# Patient Record
Sex: FEMALE | Race: WHITE | Employment: OTHER | URBAN - METROPOLITAN AREA
[De-identification: names, ages, dates, MRNs, and addresses within clinical notes are randomized per-mention and may not be internally consistent; named-entity substitution may affect disease eponyms.]

---

## 2023-12-08 PROBLEM — I35.9 AORTIC VALVE DISORDER: Status: ACTIVE | Noted: 2023-12-08

## 2023-12-08 PROBLEM — I10 HYPERTENSION: Status: ACTIVE | Noted: 2023-12-08

## 2023-12-08 PROBLEM — E78.00 HYPERCHOLESTEROLEMIA: Status: ACTIVE | Noted: 2023-12-08

## 2023-12-08 PROBLEM — I35.0 AORTIC STENOSIS: Status: ACTIVE | Noted: 2023-12-08

## 2023-12-08 PROBLEM — I50.9 CONGESTIVE HEART FAILURE (MULTI): Status: ACTIVE | Noted: 2023-12-08

## 2023-12-08 PROBLEM — E11.9 DIABETES MELLITUS, TYPE 2 (MULTI): Status: ACTIVE | Noted: 2023-12-08

## 2023-12-08 RX ORDER — LOSARTAN POTASSIUM 100 MG/1
1 TABLET ORAL DAILY
COMMUNITY
Start: 2014-01-15 | End: 2024-05-22 | Stop reason: SDUPTHER

## 2023-12-08 RX ORDER — BLOOD-GLUCOSE METER
EACH MISCELLANEOUS
COMMUNITY
Start: 2017-10-23

## 2023-12-08 RX ORDER — METFORMIN HYDROCHLORIDE 500 MG/1
2 TABLET ORAL DAILY
COMMUNITY
Start: 2021-04-07

## 2023-12-08 RX ORDER — ACETAMINOPHEN 500 MG
1 TABLET ORAL DAILY
COMMUNITY
Start: 2014-11-05

## 2023-12-08 RX ORDER — ATORVASTATIN CALCIUM 20 MG/1
1 TABLET, FILM COATED ORAL NIGHTLY
COMMUNITY
Start: 2017-08-23 | End: 2024-05-22 | Stop reason: SDUPTHER

## 2023-12-08 RX ORDER — HYDROCHLOROTHIAZIDE 50 MG/1
1 TABLET ORAL DAILY
COMMUNITY
Start: 2014-01-15 | End: 2023-12-29 | Stop reason: SDUPTHER

## 2023-12-08 RX ORDER — POTASSIUM CHLORIDE 750 MG/1
TABLET, FILM COATED, EXTENDED RELEASE ORAL
COMMUNITY
Start: 2019-06-05

## 2023-12-08 RX ORDER — AMLODIPINE BESYLATE 10 MG/1
1 TABLET ORAL DAILY
COMMUNITY
Start: 2014-01-15 | End: 2024-05-22 | Stop reason: SDUPTHER

## 2023-12-13 ENCOUNTER — OFFICE VISIT (OUTPATIENT)
Dept: CARDIOLOGY | Facility: CLINIC | Age: 73
End: 2023-12-13
Payer: MEDICARE

## 2023-12-13 VITALS
HEIGHT: 63 IN | SYSTOLIC BLOOD PRESSURE: 104 MMHG | HEART RATE: 75 BPM | BODY MASS INDEX: 36.48 KG/M2 | OXYGEN SATURATION: 96 % | DIASTOLIC BLOOD PRESSURE: 67 MMHG | WEIGHT: 205.9 LBS

## 2023-12-13 DIAGNOSIS — E78.00 HYPERCHOLESTEROLEMIA: ICD-10-CM

## 2023-12-13 DIAGNOSIS — I50.9 CONGESTIVE HEART FAILURE, UNSPECIFIED HF CHRONICITY, UNSPECIFIED HEART FAILURE TYPE (MULTI): ICD-10-CM

## 2023-12-13 DIAGNOSIS — I15.9 SECONDARY HYPERTENSION: Primary | ICD-10-CM

## 2023-12-13 DIAGNOSIS — I35.0 NONRHEUMATIC AORTIC VALVE STENOSIS: ICD-10-CM

## 2023-12-13 PROCEDURE — 1159F MED LIST DOCD IN RCRD: CPT | Performed by: INTERNAL MEDICINE

## 2023-12-13 PROCEDURE — 93010 ELECTROCARDIOGRAM REPORT: CPT | Performed by: INTERNAL MEDICINE

## 2023-12-13 PROCEDURE — 4010F ACE/ARB THERAPY RXD/TAKEN: CPT | Performed by: INTERNAL MEDICINE

## 2023-12-13 PROCEDURE — 99214 OFFICE O/P EST MOD 30 MIN: CPT | Mod: PO,25 | Performed by: INTERNAL MEDICINE

## 2023-12-13 PROCEDURE — 3078F DIAST BP <80 MM HG: CPT | Performed by: INTERNAL MEDICINE

## 2023-12-13 PROCEDURE — 1126F AMNT PAIN NOTED NONE PRSNT: CPT | Performed by: INTERNAL MEDICINE

## 2023-12-13 PROCEDURE — 3074F SYST BP LT 130 MM HG: CPT | Performed by: INTERNAL MEDICINE

## 2023-12-13 PROCEDURE — 1036F TOBACCO NON-USER: CPT | Performed by: INTERNAL MEDICINE

## 2023-12-13 PROCEDURE — 93005 ELECTROCARDIOGRAM TRACING: CPT | Mod: PO | Performed by: INTERNAL MEDICINE

## 2023-12-13 PROCEDURE — 99214 OFFICE O/P EST MOD 30 MIN: CPT | Performed by: INTERNAL MEDICINE

## 2023-12-13 RX ORDER — SEMAGLUTIDE 1.34 MG/ML
1 INJECTION, SOLUTION SUBCUTANEOUS
COMMUNITY

## 2023-12-13 ASSESSMENT — PAIN SCALES - GENERAL: PAINLEVEL: 0-NO PAIN

## 2023-12-13 ASSESSMENT — PATIENT HEALTH QUESTIONNAIRE - PHQ9
SUM OF ALL RESPONSES TO PHQ9 QUESTIONS 1 AND 2: 0
2. FEELING DOWN, DEPRESSED OR HOPELESS: NOT AT ALL
1. LITTLE INTEREST OR PLEASURE IN DOING THINGS: NOT AT ALL

## 2023-12-13 ASSESSMENT — COLUMBIA-SUICIDE SEVERITY RATING SCALE - C-SSRS
6. HAVE YOU EVER DONE ANYTHING, STARTED TO DO ANYTHING, OR PREPARED TO DO ANYTHING TO END YOUR LIFE?: NO
1. IN THE PAST MONTH, HAVE YOU WISHED YOU WERE DEAD OR WISHED YOU COULD GO TO SLEEP AND NOT WAKE UP?: NO
2. HAVE YOU ACTUALLY HAD ANY THOUGHTS OF KILLING YOURSELF?: NO

## 2023-12-13 ASSESSMENT — ENCOUNTER SYMPTOMS
LOSS OF SENSATION IN FEET: 0
DEPRESSION: 0
OCCASIONAL FEELINGS OF UNSTEADINESS: 0

## 2023-12-13 NOTE — PROGRESS NOTES
Primary Care Physician: Siomara Cormier MD  Date of Visit: 12/13/2023 11:20 AM EST  [place] [facility] [place] [address] [place]        HPI / Summary:   This is a 72-year-old woman who presented in 1994 with congestive heart failure, severe left ventricular dysfunction with ejection fraction of 20% and severe hypertension  After controlling her hypertension with ACE inhibitors and beta blockers her ejection fraction went up to 60%.  She also has aortic stenosis and obesity  Her echocardiogram from 1/8/13 showed normal left ventricular systolic function mild concentric hypertrophy and mild to moderate aortic stenosis with peak gradient of 20 mm of mercury and aortic valve area of 1.1  15 years ago she had lumpectomy for breast cancer and received radiation and chemotherapy with excellent results She is doing well with no chest pain and no shortness of breath  Repeat echocardiogram on January 1, 2019 showed normal left ventricular systolic function with ejection fraction of 60% with mild hypertrophy and abnormal relaxation and enlarged left atrium. She had mild to moderate calcific aortic stenosis with no significant change from November 1, 2016  Repeat echocardiogram on April 26, 2021 showed normal left ventricular systolic function with ejection fraction of 60%  It was technically difficult but was suggestive of mild aortic stenosis also on exam  Blood test on December 7, 2022 was all normal except for glucose of 113 cholesterol was 175 and HDL 63.4  She is doing well. No chest pain and no shortness of breath and remains active  She is taking Ozempic and lost 20 pounds in the past 1 year and working on diet to lose more  Continue current medical therapy.  She will get blood test with her primary doctor.  Follow-up in 6 months        Past Medical History:  Past Medical History:   Diagnosis Date    Personal history of other endocrine, nutritional and metabolic disease     History of obesity        Past Surgical  History:  Past Surgical History:   Procedure Laterality Date    INCISIONAL BREAST BIOPSY  10/05/2016    Incisional Breast Biopsy    OTHER SURGICAL HISTORY  10/05/2016    Episiotomy          Social History:   reports that she has never smoked. She has never used smokeless tobacco. She reports current alcohol use of about 2.0 standard drinks of alcohol per week. She reports that she does not use drugs.     Family History:  family history is not on file.      Allergies:  No Known Allergies    Outpatient Medications:  Current Outpatient Medications   Medication Instructions    amLODIPine (Norvasc) 10 mg tablet 1 tablet, oral, Daily    atorvastatin (Lipitor) 20 mg tablet 1 tablet, oral, Nightly    blood sugar diagnostic (OneTouch Verio test strips) strip U TO TEST D    cholecalciferol (Vitamin D-3) 50 mcg (2,000 unit) capsule 1 capsule, oral, Daily    hydroCHLOROthiazide (HYDRODiuril) 50 mg tablet 1 tablet, oral, Daily    losartan (Cozaar) 100 mg tablet 1 tablet, oral, Daily    metFORMIN (Glucophage) 500 mg tablet 2 tablets, oral, Daily    Ozempic 1 mg, subcutaneous, Weekly    potassium chloride CR 10 mEq ER tablet oral, Daily RT       Physical Exam:  Constitutional: alert and in no acute distress.  Eyes: no erythema, swelling or discharge from the eye.  Neck: neck is supple, symmetric, trachea midline, no masses, and no thyromegaly.  Pulmonary: no increased work of breathing or signs of respiratory distress and lungs clear to auscultation.  Cardiovascular: carotid pulses 2+ bilaterally with no bruit, JVP was normal, no thrills, regular rhythm, normal S1 and S2, grade 2/6 ejection systolic murmur at the right intercostal space, pedal pulses 2+ bilaterally and no pitting edema.  Abdomen: Abdomen non-tender, no masses, and no hepatomegaly.  Skin: Skin warm and dry, normal skin turgor  Neurologic: non- focal neurologic examination.  Psychiatric: judgement and insight is normal and oriented to person place and time.     "  Vitals:    12/13/23 1147   BP: 104/67   BP Location: Left arm   Patient Position: Sitting   BP Cuff Size: Large adult   Pulse: 75   SpO2: 96%   Weight: 93.4 kg (205 lb 14.4 oz)   Height: 1.6 m (5' 3\")     Wt Readings from Last 5 Encounters:   12/13/23 93.4 kg (205 lb 14.4 oz)   06/28/23 96.3 kg (212 lb 4 oz)   12/07/22 102 kg (225 lb 11.2 oz)   07/06/22 102 kg (224 lb 5 oz)   01/05/22 104 kg (230 lb)     Body mass index is 36.47 kg/m².      Last Labs:  CMP:  Recent Labs     12/07/22  1233 01/05/22  1209 04/07/21  1215 12/11/19  1114 06/05/19  1209    141 141 141 141   K 3.8 3.9 3.7 3.5 3.4*    99 100 98 101   CO2 32 31 32 32 28   ANIONGAP 15 15 13 15 15   BUN 13 11 14 13 21   CREATININE 0.53 0.62 0.67 0.62 0.55   GLUCOSE 113* 102* 114* 108* 97     Recent Labs     12/07/22  1233 03/10/22  0841 01/05/22  1209 04/07/21  1215 12/26/18  1254   ALBUMIN 4.4  --  4.5 4.4 4.6   ALKPHOS 60  --  61 59 61   ALT 16 20 21 26 20   AST 12 13 14 18 16   BILITOT 0.6  --  0.6 0.6 0.6     CBC:  Recent Labs     12/07/22  1233 01/05/22  1209 04/07/21  1215 06/05/19  1209   WBC 7.7 8.1 8.2 10.1   HGB 13.9 15.1 15.3 14.5   HCT 42.9 44.2 45.5 44.8    244 254 236   MCV 98 96 97 97     COAG: No results for input(s): \"INR\", \"DDIMERVTE\" in the last 28700 hours.  HEME/ENDO:  Recent Labs     07/05/23  0842 01/03/23  1020 12/07/22  1233 06/28/22  0920 01/05/22  1209 12/13/21  1119 04/07/21  1215 06/05/19  1209   TSH  --   --  2.63  --  2.67  --  2.73 2.73   HGBA1C 5.6 6.2  --  6.3*  --  6.1 6.7  --       CARDIAC: No results for input(s): \"LDH\", \"CKMB\", \"TROPHS\", \"BNP\" in the last 21614 hours.    No lab exists for component: \"CK\", \"CKMBP\"  Recent Labs     12/07/22  1233 03/10/22  0841 01/05/22  1209 06/05/19  1209   CHOL 175 185 200* 188   LDLF  --  100* 110* 87   HDL 63.4 58.6 60.2 62.6   TRIG  --  130 150* 192*       Last Cardiology Tests:  ECG:  The electrocardiogram shows normal sinus rhythm with left axis deviation and " poor R wave progression    Echo:  Echo Results:  No results found for this or any previous visit from the past 3650 days.       Cath:      Stress Test:  Stress Results:  No results found for this or any previous visit from the past 365 days.         Cardiac Imaging:  Electrocardiogram 12 Lead  Sinus rhythm with Premature ventricular complexes  Low voltage QRS, consider pulmonary disease, pericardial effusion, or normal variant  Borderline ECG  When compared with ECG of 01-SEP-2021 11:22,  Premature ventricular complexes is now Present  Confirmed by Reagan Torres ([id number]) on 12/7/2022 4:54:24 PM          Orders:  No orders of the defined types were placed in this encounter.     Followup Appts:  No future appointments.        ____________________________________________________________  Reagan Torres MD  [facility]

## 2023-12-14 LAB
ATRIAL RATE: 75 BPM
P AXIS: 48 DEGREES
P OFFSET: 156 MS
P ONSET: 116 MS
PR INTERVAL: 190 MS
Q ONSET: 211 MS
QRS COUNT: 12 BEATS
QRS DURATION: 96 MS
QT INTERVAL: 420 MS
QTC CALCULATION(BAZETT): 469 MS
QTC FREDERICIA: 452 MS
R AXIS: -24 DEGREES
T AXIS: 47 DEGREES
T OFFSET: 421 MS
VENTRICULAR RATE: 75 BPM

## 2023-12-29 ENCOUNTER — TELEPHONE (OUTPATIENT)
Dept: CARDIOLOGY | Facility: HOSPITAL | Age: 73
End: 2023-12-29

## 2023-12-29 DIAGNOSIS — I15.9 SECONDARY HYPERTENSION: ICD-10-CM

## 2023-12-29 DIAGNOSIS — I50.9 CONGESTIVE HEART FAILURE, UNSPECIFIED HF CHRONICITY, UNSPECIFIED HEART FAILURE TYPE (MULTI): ICD-10-CM

## 2023-12-29 RX ORDER — HYDROCHLOROTHIAZIDE 50 MG/1
50 TABLET ORAL DAILY
Qty: 90 TABLET | Refills: 0 | Status: SHIPPED | OUTPATIENT
Start: 2023-12-29 | End: 2024-01-10 | Stop reason: SDUPTHER

## 2024-01-11 RX ORDER — HYDROCHLOROTHIAZIDE 50 MG/1
50 TABLET ORAL DAILY
Qty: 90 TABLET | Refills: 0 | Status: SHIPPED | OUTPATIENT
Start: 2024-01-11 | End: 2024-04-15

## 2024-05-22 ENCOUNTER — TELEPHONE (OUTPATIENT)
Dept: CARDIOLOGY | Facility: HOSPITAL | Age: 74
End: 2024-05-22

## 2024-05-22 DIAGNOSIS — I50.9 CONGESTIVE HEART FAILURE, UNSPECIFIED HF CHRONICITY, UNSPECIFIED HEART FAILURE TYPE (MULTI): ICD-10-CM

## 2024-05-22 DIAGNOSIS — I15.9 SECONDARY HYPERTENSION: Primary | ICD-10-CM

## 2024-05-22 DIAGNOSIS — E78.00 HYPERCHOLESTEROLEMIA: ICD-10-CM

## 2024-05-22 RX ORDER — HYDROCHLOROTHIAZIDE 50 MG/1
50 TABLET ORAL DAILY
Qty: 90 TABLET | Refills: 0 | Status: SHIPPED | OUTPATIENT
Start: 2024-05-22

## 2024-05-22 RX ORDER — AMLODIPINE BESYLATE 10 MG/1
10 TABLET ORAL DAILY
Qty: 90 TABLET | Refills: 0 | Status: SHIPPED | OUTPATIENT
Start: 2024-05-22

## 2024-05-22 RX ORDER — LOSARTAN POTASSIUM 100 MG/1
100 TABLET ORAL DAILY
Qty: 90 TABLET | Refills: 0 | Status: SHIPPED | OUTPATIENT
Start: 2024-05-22

## 2024-05-22 RX ORDER — METRONIDAZOLE 7.5 MG/G
CREAM TOPICAL
COMMUNITY
Start: 2024-03-27

## 2024-05-22 RX ORDER — ATORVASTATIN CALCIUM 20 MG/1
20 TABLET, FILM COATED ORAL NIGHTLY
Qty: 90 TABLET | Refills: 0 | Status: SHIPPED | OUTPATIENT
Start: 2024-05-22

## 2024-06-17 ENCOUNTER — TELEPHONE (OUTPATIENT)
Dept: CARDIOLOGY | Facility: HOSPITAL | Age: 74
End: 2024-06-17
Payer: MEDICARE

## 2024-06-17 DIAGNOSIS — I15.9 SECONDARY HYPERTENSION: ICD-10-CM

## 2024-06-17 DIAGNOSIS — I50.9 CONGESTIVE HEART FAILURE, UNSPECIFIED HF CHRONICITY, UNSPECIFIED HEART FAILURE TYPE (MULTI): ICD-10-CM

## 2024-06-17 RX ORDER — HYDROCHLOROTHIAZIDE 50 MG/1
50 TABLET ORAL DAILY
Qty: 90 TABLET | Refills: 3 | Status: SHIPPED | OUTPATIENT
Start: 2024-06-17

## 2024-06-17 RX ORDER — POTASSIUM CHLORIDE 750 MG/1
20 TABLET, FILM COATED, EXTENDED RELEASE ORAL
Qty: 180 TABLET | Refills: 3 | Status: SHIPPED | OUTPATIENT
Start: 2024-06-17

## 2024-06-19 ENCOUNTER — APPOINTMENT (OUTPATIENT)
Dept: CARDIOLOGY | Facility: CLINIC | Age: 74
End: 2024-06-19
Payer: MEDICARE

## 2024-07-09 PROBLEM — H52.4 PRESBYOPIA OF BOTH EYES: Status: ACTIVE | Noted: 2023-06-23

## 2024-07-09 PROBLEM — H52.13 MYOPIA OF BOTH EYES: Status: ACTIVE | Noted: 2023-06-23

## 2024-07-09 PROBLEM — Z86.010 HX OF ADENOMATOUS COLONIC POLYPS: Status: ACTIVE | Noted: 2024-07-09

## 2024-07-09 PROBLEM — H43.813 PVD (POSTERIOR VITREOUS DETACHMENT), BOTH EYES: Status: ACTIVE | Noted: 2023-06-23

## 2024-07-09 PROBLEM — H52.203 ASTIGMATISM OF BOTH EYES: Status: ACTIVE | Noted: 2023-06-23

## 2024-07-09 PROBLEM — H25.9 AGE-RELATED CATARACT OF BOTH EYES: Status: ACTIVE | Noted: 2023-06-23

## 2024-07-09 PROBLEM — Z86.0101 HX OF ADENOMATOUS COLONIC POLYPS: Status: ACTIVE | Noted: 2024-07-09

## 2024-07-09 PROBLEM — H00.15 CHALAZION OF LEFT LOWER EYELID: Status: ACTIVE | Noted: 2023-06-23

## 2024-07-09 PROBLEM — H04.129 DRY EYE SYNDROME: Status: ACTIVE | Noted: 2023-06-23

## 2024-07-09 RX ORDER — SEMAGLUTIDE 2.68 MG/ML
INJECTION, SOLUTION SUBCUTANEOUS
COMMUNITY
Start: 2024-05-24

## 2024-07-09 RX ORDER — METFORMIN HYDROCHLORIDE 500 MG/1
1 TABLET, EXTENDED RELEASE ORAL
COMMUNITY
Start: 2024-05-21

## 2024-07-10 ENCOUNTER — OFFICE VISIT (OUTPATIENT)
Dept: CARDIOLOGY | Facility: CLINIC | Age: 74
End: 2024-07-10
Payer: MEDICARE

## 2024-07-10 ENCOUNTER — LAB (OUTPATIENT)
Dept: LAB | Facility: LAB | Age: 74
End: 2024-07-10
Payer: MEDICARE

## 2024-07-10 VITALS
HEIGHT: 63 IN | BODY MASS INDEX: 36.68 KG/M2 | OXYGEN SATURATION: 95 % | HEART RATE: 62 BPM | DIASTOLIC BLOOD PRESSURE: 74 MMHG | WEIGHT: 207 LBS | SYSTOLIC BLOOD PRESSURE: 128 MMHG

## 2024-07-10 DIAGNOSIS — I10 ESSENTIAL HYPERTENSION: ICD-10-CM

## 2024-07-10 DIAGNOSIS — I15.9 SECONDARY HYPERTENSION: ICD-10-CM

## 2024-07-10 DIAGNOSIS — I10 ESSENTIAL HYPERTENSION: Primary | ICD-10-CM

## 2024-07-10 DIAGNOSIS — I35.0 NONRHEUMATIC AORTIC VALVE STENOSIS: ICD-10-CM

## 2024-07-10 DIAGNOSIS — I50.9 CONGESTIVE HEART FAILURE, UNSPECIFIED HF CHRONICITY, UNSPECIFIED HEART FAILURE TYPE (MULTI): ICD-10-CM

## 2024-07-10 DIAGNOSIS — E78.00 HYPERCHOLESTEROLEMIA: ICD-10-CM

## 2024-07-10 LAB
ANION GAP SERPL CALC-SCNC: 10 MMOL/L (ref 10–20)
BUN SERPL-MCNC: 16 MG/DL (ref 6–23)
CALCIUM SERPL-MCNC: 10.1 MG/DL (ref 8.6–10.6)
CHLORIDE SERPL-SCNC: 101 MMOL/L (ref 98–107)
CO2 SERPL-SCNC: 33 MMOL/L (ref 21–32)
CREAT SERPL-MCNC: 0.59 MG/DL (ref 0.5–1.05)
EGFRCR SERPLBLD CKD-EPI 2021: >90 ML/MIN/1.73M*2
GLUCOSE SERPL-MCNC: 94 MG/DL (ref 74–99)
POTASSIUM SERPL-SCNC: 4 MMOL/L (ref 3.5–5.3)
SODIUM SERPL-SCNC: 140 MMOL/L (ref 136–145)

## 2024-07-10 PROCEDURE — 80048 BASIC METABOLIC PNL TOTAL CA: CPT

## 2024-07-10 PROCEDURE — 3078F DIAST BP <80 MM HG: CPT | Performed by: INTERNAL MEDICINE

## 2024-07-10 PROCEDURE — 1036F TOBACCO NON-USER: CPT | Performed by: INTERNAL MEDICINE

## 2024-07-10 PROCEDURE — 3074F SYST BP LT 130 MM HG: CPT | Performed by: INTERNAL MEDICINE

## 2024-07-10 PROCEDURE — 99214 OFFICE O/P EST MOD 30 MIN: CPT | Performed by: INTERNAL MEDICINE

## 2024-07-10 PROCEDURE — 36415 COLL VENOUS BLD VENIPUNCTURE: CPT

## 2024-07-10 PROCEDURE — 1126F AMNT PAIN NOTED NONE PRSNT: CPT | Performed by: INTERNAL MEDICINE

## 2024-07-10 PROCEDURE — 1159F MED LIST DOCD IN RCRD: CPT | Performed by: INTERNAL MEDICINE

## 2024-07-10 PROCEDURE — 4010F ACE/ARB THERAPY RXD/TAKEN: CPT | Performed by: INTERNAL MEDICINE

## 2024-07-10 RX ORDER — POTASSIUM CHLORIDE 750 MG/1
10 TABLET, FILM COATED, EXTENDED RELEASE ORAL
Qty: 180 TABLET | Refills: 3 | Status: SHIPPED | OUTPATIENT
Start: 2024-07-10

## 2024-07-10 ASSESSMENT — PATIENT HEALTH QUESTIONNAIRE - PHQ9
1. LITTLE INTEREST OR PLEASURE IN DOING THINGS: NOT AT ALL
SUM OF ALL RESPONSES TO PHQ9 QUESTIONS 1 AND 2: 0
2. FEELING DOWN, DEPRESSED OR HOPELESS: NOT AT ALL

## 2024-07-10 ASSESSMENT — ENCOUNTER SYMPTOMS
OCCASIONAL FEELINGS OF UNSTEADINESS: 0
DEPRESSION: 0
LOSS OF SENSATION IN FEET: 0

## 2024-07-10 ASSESSMENT — COLUMBIA-SUICIDE SEVERITY RATING SCALE - C-SSRS
2. HAVE YOU ACTUALLY HAD ANY THOUGHTS OF KILLING YOURSELF?: NO
6. HAVE YOU EVER DONE ANYTHING, STARTED TO DO ANYTHING, OR PREPARED TO DO ANYTHING TO END YOUR LIFE?: NO
1. IN THE PAST MONTH, HAVE YOU WISHED YOU WERE DEAD OR WISHED YOU COULD GO TO SLEEP AND NOT WAKE UP?: NO

## 2024-07-10 ASSESSMENT — PAIN SCALES - GENERAL: PAINLEVEL: 0-NO PAIN

## 2024-07-10 NOTE — PROGRESS NOTES
Primary Care Physician: Siomara Cormier MD  Date of Visit: 07/10/2024 11:20 AM EDT  [place] [facility] [place] [address] [place]        HPI / Summary:   Mary Kay De Luna is a 73 y.o. female  who presented in 1994 with congestive heart failure, severe left ventricular dysfunction with ejection fraction of 20% and severe hypertension  After controlling her hypertension with ACE inhibitors and beta blockers her ejection fraction went up to 60%.  She also has aortic stenosis and obesity  Her echocardiogram from 1/8/13 showed normal left ventricular systolic function mild concentric hypertrophy and mild to moderate aortic stenosis with peak gradient of 20 mm of mercury and aortic valve area of 1.1  15 years ago she had lumpectomy for breast cancer and received radiation and chemotherapy with excellent results She is doing well with no chest pain and no shortness of breath  Repeat echocardiogram on January 1, 2019 showed normal left ventricular systolic function with ejection fraction of 60% with mild hypertrophy and abnormal relaxation and enlarged left atrium. She had mild to moderate calcific aortic stenosis with no significant change from November 1, 2016  Repeat echocardiogram on April 26, 2021 showed normal left ventricular systolic function with ejection fraction of 60%  It was technically difficult but was suggestive of mild aortic stenosis also on exam  Blood test on December 7, 2022 was all normal except for glucose of 113 cholesterol was 175 and HDL 63.4  She is doing well. No chest pain and no shortness of breath and remains active  She is taking Ozempic and lost 20 pounds in the past 1 year and working on diet to lose more.  It is unchanged the past 6 months  Although the faint neck bruits are most likely radiation from aortic stenosis murmur it appears reasonable to get ultrasound of the carotids as ordered by primary doctor  Continue current medical therapy.  She will get blood test  Follow-up in 6 months              Past Medical History:  Past Medical History:   Diagnosis Date    Personal history of other endocrine, nutritional and metabolic disease     History of obesity        Past Surgical History:  Past Surgical History:   Procedure Laterality Date    INCISIONAL BREAST BIOPSY  10/05/2016    Incisional Breast Biopsy    OTHER SURGICAL HISTORY  10/05/2016    Episiotomy          Social History:   reports that she has never smoked. She has never used smokeless tobacco. She reports current alcohol use of about 2.0 standard drinks of alcohol per week. She reports that she does not use drugs.     Family History:  family history is not on file.      Allergies:  No Known Allergies    Outpatient Medications:  Current Outpatient Medications   Medication Instructions    amLODIPine (NORVASC) 10 mg, oral, Daily    atorvastatin (LIPITOR) 20 mg, oral, Nightly    blood sugar diagnostic (OneTouch Verio test strips) strip U TO TEST D    cholecalciferol (Vitamin D-3) 50 mcg (2,000 unit) capsule 1 capsule, oral, Daily    hydroCHLOROthiazide (HYDRODIURIL) 50 mg, oral, Daily    losartan (COZAAR) 100 mg, oral, Daily    metFORMIN  mg 24 hr tablet 1 tablet, oral, Daily (0630)    Ozempic 2 mg/dose (8 mg/3 mL) pen injector INJECT 2 MG UNDER THE SKIN ONE DAY A WEEK    potassium chloride CR 10 mEq ER tablet 20 mEq, oral, Daily RT       Physical Exam:  Constitutional: alert and in no acute distress.  Eyes: no erythema, swelling or discharge from the eye.  Neck: neck is supple, symmetric, trachea midline, no masses, and no thyromegaly.  Pulmonary: no increased work of breathing or signs of respiratory distress and lungs clear to auscultation.  Cardiovascular: carotid pulses 2+ bilaterally with faint bilateral bruits, JVP was normal, no thrills, regular rhythm, normal S1 and S2, grade 2/6 ejection systolic murmur at the second right intercostal space, pedal pulses 2+ bilaterally and no pitting edema.  Abdomen: Abdomen non-tender, no masses,  "and no hepatomegaly.  Skin: Skin warm and dry, normal skin turgor  Neurologic: non- focal neurologic examination.  Psychiatric: judgement and insight is normal and oriented to person place and time.      Vitals:    07/10/24 1150   BP: 128/74   BP Location: Left arm   Patient Position: Sitting   BP Cuff Size: Large adult   Pulse: 62   SpO2: 95%   Weight: 93.9 kg (207 lb)   Height: 1.6 m (5' 3\")     Wt Readings from Last 5 Encounters:   07/10/24 93.9 kg (207 lb)   12/13/23 93.4 kg (205 lb 14.4 oz)   06/28/23 96.3 kg (212 lb 4 oz)   12/07/22 102 kg (225 lb 11.2 oz)   07/06/22 102 kg (224 lb 5 oz)     Body mass index is 36.67 kg/m².      Last Labs:  CMP:  Recent Labs     12/07/22  1233 01/05/22  1209 04/07/21  1215 12/11/19  1114 06/05/19  1209    141 141 141 141   K 3.8 3.9 3.7 3.5 3.4*    99 100 98 101   CO2 32 31 32 32 28   ANIONGAP 15 15 13 15 15   BUN 13 11 14 13 21   CREATININE 0.53 0.62 0.67 0.62 0.55   GLUCOSE 113* 102* 114* 108* 97     Recent Labs     12/07/22  1233 03/10/22  0841 01/05/22  1209 04/07/21  1215 12/26/18  1254   ALBUMIN 4.4  --  4.5 4.4 4.6   ALKPHOS 60  --  61 59 61   ALT 16 20 21 26 20   AST 12 13 14 18 16   BILITOT 0.6  --  0.6 0.6 0.6     CBC:  Recent Labs     12/07/22  1233 01/05/22  1209 04/07/21  1215 06/05/19  1209   WBC 7.7 8.1 8.2 10.1   HGB 13.9 15.1 15.3 14.5   HCT 42.9 44.2 45.5 44.8    244 254 236   MCV 98 96 97 97     COAG: No results for input(s): \"INR\", \"DDIMERVTE\" in the last 91688 hours.  HEME/ENDO:  Recent Labs     07/08/24  1033 12/21/23  1255 07/05/23  0842 01/03/23  1020 12/07/22  1233 06/28/22  0920 01/05/22  1209 12/13/21  1119 04/07/21  1215 06/05/19  1209   TSH  --   --   --   --  2.63  --  2.67  --  2.73 2.73   HGBA1C 5.3 5.1 5.6 6.2  --    < >  --    < > 6.7  --     < > = values in this interval not displayed.      CARDIAC: No results for input(s): \"LDH\", \"CKMB\", \"TROPHS\", \"BNP\" in the last 78174 hours.    No lab exists for component: \"CK\", " "\"CKMBP\"  Recent Labs     12/07/22  1233 03/10/22  0841 01/05/22  1209 06/05/19  1209   CHOL 175 185 200* 188   LDLF  --  100* 110* 87   HDL 63.4 58.6 60.2 62.6   TRIG  --  130 150* 192*       Last Cardiology Tests:  ECG:        Echo:  Echo Results:  No results found for this or any previous visit from the past 3650 days.       Cath:      Stress Test:  Stress Results:  No results found for this or any previous visit from the past 365 days.         Cardiac Imaging:  ECG 12 lead (Clinic Performed)  Normal sinus rhythm  Low voltage QRS  Cannot rule out Inferior infarct , age undetermined  Abnormal ECG  When compared with ECG of 07-DEC-2022 11:44,  Premature ventricular complexes are no longer Present  Questionable change in QRS axis  Confirmed by Reagan Torres ([id number]) on 12/14/2023 12:59:30 PM          Orders:  No orders of the defined types were placed in this encounter.     Followup Appts:  No future appointments.        ____________________________________________________________  Reagan Torres MD  [facility]  "

## 2024-09-19 ENCOUNTER — TELEPHONE (OUTPATIENT)
Dept: CARDIOLOGY | Facility: HOSPITAL | Age: 74
End: 2024-09-19

## 2024-09-19 DIAGNOSIS — I15.9 SECONDARY HYPERTENSION: ICD-10-CM

## 2024-09-19 DIAGNOSIS — E78.00 HYPERCHOLESTEROLEMIA: ICD-10-CM

## 2024-09-19 RX ORDER — LOSARTAN POTASSIUM 100 MG/1
100 TABLET ORAL DAILY
Qty: 90 TABLET | Refills: 0 | Status: SHIPPED | OUTPATIENT
Start: 2024-09-19

## 2024-09-19 RX ORDER — ATORVASTATIN CALCIUM 20 MG/1
20 TABLET, FILM COATED ORAL NIGHTLY
Qty: 90 TABLET | Refills: 0 | Status: SHIPPED | OUTPATIENT
Start: 2024-09-19

## 2024-09-19 RX ORDER — AMLODIPINE BESYLATE 10 MG/1
10 TABLET ORAL DAILY
Qty: 90 TABLET | Refills: 0 | Status: SHIPPED | OUTPATIENT
Start: 2024-09-19

## 2024-11-07 ENCOUNTER — APPOINTMENT (OUTPATIENT)
Dept: CARDIOLOGY | Facility: CLINIC | Age: 74
End: 2024-11-07
Payer: MEDICARE

## 2024-11-07 VITALS
BODY MASS INDEX: 35.96 KG/M2 | OXYGEN SATURATION: 93 % | SYSTOLIC BLOOD PRESSURE: 125 MMHG | WEIGHT: 203 LBS | DIASTOLIC BLOOD PRESSURE: 79 MMHG | HEART RATE: 77 BPM

## 2024-11-07 DIAGNOSIS — I35.0 NONRHEUMATIC AORTIC VALVE STENOSIS: ICD-10-CM

## 2024-11-07 DIAGNOSIS — I50.9 CONGESTIVE HEART FAILURE, UNSPECIFIED HF CHRONICITY, UNSPECIFIED HEART FAILURE TYPE: Primary | ICD-10-CM

## 2024-11-07 DIAGNOSIS — I10 ESSENTIAL HYPERTENSION: ICD-10-CM

## 2024-11-07 DIAGNOSIS — E78.00 HYPERCHOLESTEROLEMIA: ICD-10-CM

## 2024-11-07 PROCEDURE — 1126F AMNT PAIN NOTED NONE PRSNT: CPT | Performed by: INTERNAL MEDICINE

## 2024-11-07 PROCEDURE — 3078F DIAST BP <80 MM HG: CPT | Performed by: INTERNAL MEDICINE

## 2024-11-07 PROCEDURE — 1036F TOBACCO NON-USER: CPT | Performed by: INTERNAL MEDICINE

## 2024-11-07 PROCEDURE — 4010F ACE/ARB THERAPY RXD/TAKEN: CPT | Performed by: INTERNAL MEDICINE

## 2024-11-07 PROCEDURE — 99214 OFFICE O/P EST MOD 30 MIN: CPT | Performed by: INTERNAL MEDICINE

## 2024-11-07 PROCEDURE — 3074F SYST BP LT 130 MM HG: CPT | Performed by: INTERNAL MEDICINE

## 2024-11-07 PROCEDURE — 1159F MED LIST DOCD IN RCRD: CPT | Performed by: INTERNAL MEDICINE

## 2024-11-07 ASSESSMENT — PAIN SCALES - GENERAL: PAINLEVEL_OUTOF10: 0-NO PAIN

## 2024-11-07 ASSESSMENT — ENCOUNTER SYMPTOMS
OCCASIONAL FEELINGS OF UNSTEADINESS: 0
DEPRESSION: 0
LOSS OF SENSATION IN FEET: 0

## 2024-11-07 NOTE — PROGRESS NOTES
Primary Care Physician: Siomara Cormier MD  Date of Visit: 11/07/2024  3:00 PM EST  [place] [facility] [place] [address] [place]        HPI / Summary:   Mary Kay De Luna is a 74 y.o. female  who presented in 1994 with congestive heart failure and severe left ventricular dysfunction with ejection fraction of 20% and severe hypertension  After controlling her hypertension with ACE inhibitors and beta blockers her ejection fraction went up to 60%.  She also has aortic stenosis and obesity  Her echocardiogram from 1/8/13 showed normal left ventricular systolic function mild concentric hypertrophy and mild to moderate aortic stenosis with peak gradient of 20 mm of mercury and aortic valve area of 1.1  15 years ago she had lumpectomy for breast cancer and received radiation and chemotherapy with excellent results   Echocardiogram on January 1, 2019 showed normal left ventricular systolic function with ejection fraction of 60% with mild hypertrophy and abnormal relaxation and enlarged left atrium. She had mild to moderate calcific aortic stenosis with no significant change from November 1, 2016  Echocardiogram on April 26, 2021 showed normal left ventricular systolic function with ejection fraction of 60%  It was technically difficult but was suggestive of mild aortic stenosis   Blood test on December 7, 2022 cholesterol was 175 and HDL 63.4. Blood test on 7/10/2024 was normal including K of 4.1  She is doing well. No chest pain and no shortness of breath and remains active  She is taking Ozempic and lost 20 pounds in the past 1 year and working on diet to lose more.  It is unchanged the past 6 months  Although the faint neck bruits are most likely radiation from aortic stenosis murmur the primary doctor at the [facility] ordered ultrasound of the carotids which was performed on August 6, 2024 and was normal  She is not losing significant weight while on Ozempic and we had a long discussion about diet which is extremely  important  Continue current medical therapy. Follow-up in 6 months             Past Medical History:  Past Medical History:   Diagnosis Date    Personal history of other endocrine, nutritional and metabolic disease     History of obesity        Past Surgical History:  Past Surgical History:   Procedure Laterality Date    INCISIONAL BREAST BIOPSY  10/05/2016    Incisional Breast Biopsy    OTHER SURGICAL HISTORY  10/05/2016    Episiotomy          Social History:   reports that she has never smoked. She has never used smokeless tobacco. She reports current alcohol use of about 2.0 standard drinks of alcohol per week. She reports that she does not use drugs.     Family History:  family history is not on file.      Allergies:  No Known Allergies    Outpatient Medications:  Current Outpatient Medications   Medication Instructions    amLODIPine (NORVASC) 10 mg, oral, Daily    atorvastatin (LIPITOR) 20 mg, oral, Nightly    blood sugar diagnostic (OneTouch Verio test strips) strip U TO TEST D    cholecalciferol (Vitamin D-3) 50 mcg (2,000 unit) capsule 1 capsule, oral, Daily    hydroCHLOROthiazide (HYDRODIURIL) 50 mg, oral, Daily    losartan (COZAAR) 100 mg, oral, Daily    metFORMIN  mg 24 hr tablet 1 tablet, oral, Daily (0630)    Ozempic 2 mg/dose (8 mg/3 mL) pen injector INJECT 2 MG UNDER THE SKIN ONE DAY A WEEK    potassium chloride CR 10 mEq ER tablet TAKE 1 TABLET(10 MEQ) BY MOUTH FIVE TIMES DAILY       Physical Exam:  Constitutional: alert and in no acute distress.  Eyes: no erythema, swelling or discharge from the eye.  Neck: neck is supple, symmetric, trachea midline, no masses, and no thyromegaly.  Pulmonary: no increased work of breathing or signs of respiratory distress and lungs clear to auscultation.  Cardiovascular: carotid pulses 2+ bilaterally with no bruit, JVP was normal, no thrills, regular rhythm, normal S1 and S2, no murmurs, pedal pulses 2+ bilaterally and no pitting edema.  Abdomen: Abdomen  "non-tender, no masses, and no hepatomegaly.  Skin: Skin warm and dry, normal skin turgor  Neurologic: non- focal neurologic examination.  Psychiatric: judgement and insight is normal and oriented to person place and time.      There were no vitals filed for this visit.  Wt Readings from Last 5 Encounters:   07/10/24 93.9 kg (207 lb)   12/13/23 93.4 kg (205 lb 14.4 oz)   06/28/23 96.3 kg (212 lb 4 oz)   12/07/22 102 kg (225 lb 11.2 oz)   07/06/22 102 kg (224 lb 5 oz)     There is no height or weight on file to calculate BMI.      Last Labs:  CMP:  Recent Labs     07/10/24  1246 12/07/22  1233 01/05/22  1209 04/07/21  1215 12/11/19  1114    143 141 141 141   K 4.0 3.8 3.9 3.7 3.5    100 99 100 98   CO2 33* 32 31 32 32   ANIONGAP 10 15 15 13 15   BUN 16 13 11 14 13   CREATININE 0.59 0.53 0.62 0.67 0.62   EGFR >90  --   --   --   --    GLUCOSE 94 113* 102* 114* 108*     Recent Labs     12/07/22  1233 03/10/22  0841 01/05/22  1209 04/07/21  1215 12/26/18  1254   ALBUMIN 4.4  --  4.5 4.4 4.6   ALKPHOS 60  --  61 59 61   ALT 16 20 21 26 20   AST 12 13 14 18 16   BILITOT 0.6  --  0.6 0.6 0.6     CBC:  Recent Labs     12/07/22  1233 01/05/22  1209 04/07/21  1215 06/05/19  1209   WBC 7.7 8.1 8.2 10.1   HGB 13.9 15.1 15.3 14.5   HCT 42.9 44.2 45.5 44.8    244 254 236   MCV 98 96 97 97     COAG: No results for input(s): \"INR\", \"DDIMERVTE\" in the last 13975 hours.  HEME/ENDO:  Recent Labs     07/08/24  1033 12/21/23  1255 07/05/23  0842 01/03/23  1020 12/07/22  1233 06/28/22  0920 01/05/22  1209 12/13/21  1119 04/07/21  1215 06/05/19  1209   TSH  --   --   --   --  2.63  --  2.67  --  2.73 2.73   HGBA1C 5.3 5.1 5.6 6.2  --    < >  --    < > 6.7  --     < > = values in this interval not displayed.      CARDIAC: No results for input(s): \"LDH\", \"CKMB\", \"TROPHS\", \"BNP\" in the last 10271 hours.    No lab exists for component: \"CK\", \"CKMBP\"  Recent Labs     12/07/22  1233 03/10/22  0841 01/05/22  1209 06/05/19  1209 "   CHOL 175 185 200* 188   LDLF  --  100* 110* 87   HDL 63.4 58.6 60.2 62.6   TRIG  --  130 150* 192*       Last Cardiology Tests:  ECG:        Echo:  Echo Results:  No results found for this or any previous visit from the past 3650 days.       Cath:      Stress Test:  Stress Results:  No results found for this or any previous visit from the past 365 days.         Cardiac Imaging:  ECG 12 lead (Clinic Performed)  Normal sinus rhythm  Low voltage QRS  Cannot rule out Inferior infarct , age undetermined  Abnormal ECG  When compared with ECG of 07-DEC-2022 11:44,  Premature ventricular complexes are no longer Present  Questionable change in QRS axis  Confirmed by Reagan Torres ([id number]) on 12/14/2023 12:59:30 PM          Orders:  No orders of the defined types were placed in this encounter.     Followup Appts:  Future Appointments   Date Time Provider Department Center   11/7/2024  3:00 PM Reagan Torres MD [id number]           ____________________________________________________________  Reagan Torres MD  [facility]

## 2025-05-28 NOTE — PROGRESS NOTES
Primary Care Physician: Siomara Cormier MD  Date of Visit: 05/29/2025  2:40 PM EDT  Location of visit: [facility] [place] [address]        HPI / Summary:   Mary Kay De Luna is a 74 y.o. female  who presented in 1994 with congestive heart failure and severe left ventricular dysfunction with ejection fraction of 20% and severe hypertension  After controlling her hypertension with ACE inhibitors and beta blockers her ejection fraction went up to 60%.  She also has aortic stenosis and obesity  Her echocardiogram from 1/8/13 showed normal left ventricular systolic function mild concentric hypertrophy and mild to moderate aortic stenosis with peak gradient of 20 mm of mercury and aortic valve area of 1.1  15 years ago she had lumpectomy for breast cancer and received radiation and chemotherapy with excellent results   Echocardiogram on January 1, 2019 showed normal left ventricular systolic function with ejection fraction of 60% with mild hypertrophy and abnormal relaxation and enlarged left atrium. She had mild to moderate calcific aortic stenosis with no significant change from November 1, 2016  Echocardiogram on April 26, 2021 showed normal left ventricular systolic function with ejection fraction of 60%  It was technically difficult but was suggestive of mild aortic stenosis   Blood test on 7/10/2024 was normal including K of 4.1  Blood test from the [facility] on March 21, 2025 was normal except for a glucose of 102 potassium 3.4 and good lipid profile with cholesterol 185 HDL 73 LDL 93 triglycerides 95  She is doing well. No chest pain and no shortness of breath and remains active  She was taking Ozempic and lost 20 pounds in the past 1 year and working on diet to lose more.  It is unchanged the past 6 months  Although the faint neck bruits are most likely radiation from aortic stenosis murmur the primary doctor at the [facility] ordered ultrasound of the carotids which was performed on August 6, 2024 and was  normal  She discontinued Ozempic and now is trying Mounjaro.  Weight at present is stable  Again after testing and has no and I suggested using spironolactone which she prefers to increase the potassium to 6 times a day repeat the blood test in 2 weeks  Continue current medical therapy. Follow-up in 6 months             Past Medical History:  Past Medical History:   Diagnosis Date    Personal history of other endocrine, nutritional and metabolic disease     History of obesity        Past Surgical History:  Past Surgical History:   Procedure Laterality Date    INCISIONAL BREAST BIOPSY  10/05/2016    Incisional Breast Biopsy    OTHER SURGICAL HISTORY  10/05/2016    Episiotomy          Social History:   reports that she has never smoked. She has never used smokeless tobacco. She reports current alcohol use of about 2.0 standard drinks of alcohol per week. She reports that she does not use drugs.     Family History:  family history is not on file.      Allergies:  No Known Allergies    Outpatient Medications:  Current Outpatient Medications   Medication Instructions    amLODIPine (NORVASC) 10 mg, oral, Daily    atorvastatin (Lipitor) 20 mg tablet TAKE 1 TABLET(20 MG) BY MOUTH DAILY AT BEDTIME    blood sugar diagnostic (OneTouch Verio test strips) strip U TO TEST D    cholecalciferol (Vitamin D-3) 50 mcg (2,000 unit) capsule 1 capsule, Daily    hydroCHLOROthiazide (HYDRODIURIL) 50 mg, oral, Daily    losartan (COZAAR) 100 mg, oral, Daily    metFORMIN  mg 24 hr tablet 1 tablet, Daily (0630)    Ozempic 2 mg/dose (8 mg/3 mL) pen injector INJECT 2 MG UNDER THE SKIN ONE DAY A WEEK    potassium chloride CR 10 mEq ER tablet TAKE 1 TABLET(10 MEQ) BY MOUTH FIVE TIMES DAILY       Physical Exam:  Constitutional: alert and in no acute distress.  Eyes: no erythema, swelling or discharge from the eye.  Neck: neck is supple, symmetric, trachea midline, no masses, and no thyromegaly.  Pulmonary: no increased work of breathing or  "signs of respiratory distress and lungs clear to auscultation.  Cardiovascular: carotid pulses 2+ bilaterally with no bruit, JVP was normal, no thrills, regular rhythm, normal S1 and S2, no murmurs, pedal pulses 2+ bilaterally and no pitting edema.  Abdomen: Abdomen non-tender, no masses, and no hepatomegaly.  Skin: Skin warm and dry, normal skin turgor  Neurologic: non- focal neurologic examination.  Psychiatric: judgement and insight is normal and oriented to person place and time.      There were no vitals filed for this visit.  Wt Readings from Last 5 Encounters:   11/07/24 92.1 kg (203 lb)   07/10/24 93.9 kg (207 lb)   12/13/23 93.4 kg (205 lb 14.4 oz)   06/28/23 96.3 kg (212 lb 4 oz)   12/07/22 102 kg (225 lb 11.2 oz)     There is no height or weight on file to calculate BMI.      Last Labs:  CMP:  Recent Labs     07/10/24  1246 12/07/22  1233 01/05/22  1209 04/07/21  1215 12/11/19  1114    143 141 141 141   K 4.0 3.8 3.9 3.7 3.5    100 99 100 98   CO2 33* 32 31 32 32   ANIONGAP 10 15 15 13 15   BUN 16 13 11 14 13   CREATININE 0.59 0.53 0.62 0.67 0.62   EGFR >90  --   --   --   --    GLUCOSE 94 113* 102* 114* 108*     Recent Labs     12/07/22  1233 03/10/22  0841 01/05/22  1209 04/07/21  1215 12/26/18  1254   ALBUMIN 4.4  --  4.5 4.4 4.6   ALKPHOS 60  --  61 59 61   ALT 16 20 21 26 20   AST 12 13 14 18 16   BILITOT 0.6  --  0.6 0.6 0.6     CBC:  Recent Labs     12/07/22  1233 01/05/22  1209 04/07/21  1215 06/05/19  1209   WBC 7.7 8.1 8.2 10.1   HGB 13.9 15.1 15.3 14.5   HCT 42.9 44.2 45.5 44.8    244 254 236   MCV 98 96 97 97     COAG: No results for input(s): \"INR\", \"DDIMERVTE\" in the last 91165 hours.  HEME/ENDO:  Recent Labs     07/08/24  1033 12/21/23  1255 07/05/23  0842 01/03/23  1020 12/07/22  1233 06/28/22  0920 01/05/22  1209 12/13/21  1119 04/07/21  1215 06/05/19  1209   TSH  --   --   --   --  2.63  --  2.67  --  2.73 2.73   HGBA1C 5.3 5.1 5.6 6.2  --    < >  --    < > 6.7  --     " "< > = values in this interval not displayed.      CARDIAC: No results for input(s): \"LDH\", \"CKMB\", \"TROPHS\", \"BNP\" in the last 69609 hours.    No lab exists for component: \"CK\", \"CKMBP\"  Recent Labs     12/07/22  1233 03/10/22  0841 01/05/22  1209 06/05/19  1209   CHOL 175 185 200* 188   LDLF  --  100* 110* 87   HDL 63.4 58.6 60.2 62.6   TRIG  --  130 150* 192*       Last Cardiology Tests:  ECG:    NSR with 1st degree AV block, inferior MI with no change from 12/13/2023    Echo:  Echo Results:  No results found for this or any previous visit from the past 3650 days.       Cath:      Stress Test:  Stress Results:  No results found for this or any previous visit from the past 365 days.         Cardiac Imaging:  ECG 12 lead (Clinic Performed)  Normal sinus rhythm  Low voltage QRS  Cannot rule out Inferior infarct , age undetermined  Abnormal ECG  When compared with ECG of 07-DEC-2022 11:44,  Premature ventricular complexes are no longer Present  Questionable change in QRS axis  Confirmed by Reagan Torres ([id number]) on 12/14/2023 12:59:30 PM          Orders:  No orders of the defined types were placed in this encounter.     Followup Appts:  Future Appointments   Date Time Provider Department Center   5/29/2025  2:40 PM Reagan Torres MD [id number] [facility]           ____________________________________________________________  Reagan Torres MD  [facility]  "

## 2025-05-29 ENCOUNTER — OFFICE VISIT (OUTPATIENT)
Dept: CARDIOLOGY | Facility: CLINIC | Age: 75
End: 2025-05-29
Payer: MEDICARE

## 2025-05-29 VITALS
WEIGHT: 210 LBS | SYSTOLIC BLOOD PRESSURE: 121 MMHG | HEIGHT: 63 IN | BODY MASS INDEX: 37.21 KG/M2 | OXYGEN SATURATION: 93 % | DIASTOLIC BLOOD PRESSURE: 74 MMHG

## 2025-05-29 DIAGNOSIS — E78.00 HYPERCHOLESTEROLEMIA: ICD-10-CM

## 2025-05-29 DIAGNOSIS — I35.0 NONRHEUMATIC AORTIC VALVE STENOSIS: ICD-10-CM

## 2025-05-29 DIAGNOSIS — I10 ESSENTIAL HYPERTENSION: Primary | ICD-10-CM

## 2025-05-29 DIAGNOSIS — I50.9 CONGESTIVE HEART FAILURE, UNSPECIFIED HF CHRONICITY, UNSPECIFIED HEART FAILURE TYPE: ICD-10-CM

## 2025-05-29 DIAGNOSIS — I15.9 SECONDARY HYPERTENSION: ICD-10-CM

## 2025-05-29 PROCEDURE — 99212 OFFICE O/P EST SF 10 MIN: CPT | Performed by: INTERNAL MEDICINE

## 2025-05-29 PROCEDURE — 4010F ACE/ARB THERAPY RXD/TAKEN: CPT | Performed by: INTERNAL MEDICINE

## 2025-05-29 PROCEDURE — 3078F DIAST BP <80 MM HG: CPT | Performed by: INTERNAL MEDICINE

## 2025-05-29 PROCEDURE — 3074F SYST BP LT 130 MM HG: CPT | Performed by: INTERNAL MEDICINE

## 2025-05-29 PROCEDURE — G2211 COMPLEX E/M VISIT ADD ON: HCPCS | Performed by: INTERNAL MEDICINE

## 2025-05-29 PROCEDURE — 1126F AMNT PAIN NOTED NONE PRSNT: CPT | Performed by: INTERNAL MEDICINE

## 2025-05-29 PROCEDURE — 1159F MED LIST DOCD IN RCRD: CPT | Performed by: INTERNAL MEDICINE

## 2025-05-29 PROCEDURE — 99214 OFFICE O/P EST MOD 30 MIN: CPT | Performed by: INTERNAL MEDICINE

## 2025-05-29 PROCEDURE — 3008F BODY MASS INDEX DOCD: CPT | Performed by: INTERNAL MEDICINE

## 2025-05-29 PROCEDURE — 1036F TOBACCO NON-USER: CPT | Performed by: INTERNAL MEDICINE

## 2025-05-29 PROCEDURE — 93005 ELECTROCARDIOGRAM TRACING: CPT | Performed by: INTERNAL MEDICINE

## 2025-05-29 RX ORDER — LOSARTAN POTASSIUM 100 MG/1
100 TABLET ORAL DAILY
Qty: 90 TABLET | Refills: 0 | Status: SHIPPED | OUTPATIENT
Start: 2025-05-29

## 2025-05-29 RX ORDER — TIRZEPATIDE 5 MG/.5ML
INJECTION, SOLUTION SUBCUTANEOUS
COMMUNITY

## 2025-05-29 RX ORDER — ATORVASTATIN CALCIUM 20 MG/1
20 TABLET, FILM COATED ORAL DAILY
Qty: 90 TABLET | Refills: 0 | Status: SHIPPED | OUTPATIENT
Start: 2025-05-29 | End: 2026-05-29

## 2025-05-29 RX ORDER — AMLODIPINE BESYLATE 10 MG/1
10 TABLET ORAL DAILY
Qty: 90 TABLET | Refills: 0 | Status: SHIPPED | OUTPATIENT
Start: 2025-05-29

## 2025-05-29 ASSESSMENT — PAIN SCALES - GENERAL: PAINLEVEL_OUTOF10: 0-NO PAIN

## 2025-05-29 ASSESSMENT — ENCOUNTER SYMPTOMS
LOSS OF SENSATION IN FEET: 0
OCCASIONAL FEELINGS OF UNSTEADINESS: 0
DEPRESSION: 0

## 2025-06-04 LAB
ATRIAL RATE: 66 BPM
P AXIS: 46 DEGREES
P OFFSET: 155 MS
P ONSET: 108 MS
PR INTERVAL: 210 MS
Q ONSET: 213 MS
QRS COUNT: 11 BEATS
QRS DURATION: 90 MS
QT INTERVAL: 404 MS
QTC CALCULATION(BAZETT): 423 MS
QTC FREDERICIA: 417 MS
R AXIS: -22 DEGREES
T AXIS: 28 DEGREES
T OFFSET: 415 MS
VENTRICULAR RATE: 66 BPM